# Patient Record
Sex: MALE | Race: WHITE
[De-identification: names, ages, dates, MRNs, and addresses within clinical notes are randomized per-mention and may not be internally consistent; named-entity substitution may affect disease eponyms.]

---

## 2018-01-19 ENCOUNTER — HOSPITAL ENCOUNTER (EMERGENCY)
Dept: HOSPITAL 17 - NEPD | Age: 58
Discharge: HOME | End: 2018-01-19
Payer: COMMERCIAL

## 2018-01-19 VITALS
SYSTOLIC BLOOD PRESSURE: 181 MMHG | DIASTOLIC BLOOD PRESSURE: 93 MMHG | RESPIRATION RATE: 18 BRPM | OXYGEN SATURATION: 99 % | TEMPERATURE: 98.4 F | HEART RATE: 80 BPM

## 2018-01-19 VITALS — DIASTOLIC BLOOD PRESSURE: 85 MMHG | SYSTOLIC BLOOD PRESSURE: 131 MMHG

## 2018-01-19 DIAGNOSIS — E78.00: ICD-10-CM
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
## 2018-01-19 DIAGNOSIS — Z87.438: ICD-10-CM

## 2018-01-19 DIAGNOSIS — I10: ICD-10-CM

## 2018-01-19 DIAGNOSIS — Z86.79: ICD-10-CM

## 2018-01-19 DIAGNOSIS — R07.89: Primary | ICD-10-CM

## 2018-01-19 DIAGNOSIS — M25.511: ICD-10-CM

## 2018-01-19 DIAGNOSIS — Z72.0: ICD-10-CM

## 2018-01-19 PROCEDURE — 99283 EMERGENCY DEPT VISIT LOW MDM: CPT

## 2018-01-19 PROCEDURE — 71046 X-RAY EXAM CHEST 2 VIEWS: CPT
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
